# Patient Record
Sex: MALE | Race: WHITE | Employment: FULL TIME | URBAN - METROPOLITAN AREA
[De-identification: names, ages, dates, MRNs, and addresses within clinical notes are randomized per-mention and may not be internally consistent; named-entity substitution may affect disease eponyms.]

---

## 2020-09-15 ENCOUNTER — VIRTUAL VISIT (OUTPATIENT)
Dept: FAMILY MEDICINE | Facility: CLINIC | Age: 30
End: 2020-09-15
Payer: COMMERCIAL

## 2020-09-15 DIAGNOSIS — J02.9 SORE THROAT: ICD-10-CM

## 2020-09-15 DIAGNOSIS — R50.9 FEVER AND CHILLS: Primary | ICD-10-CM

## 2020-09-15 PROCEDURE — 99203 OFFICE O/P NEW LOW 30 MIN: CPT | Mod: 95 | Performed by: PHYSICIAN ASSISTANT

## 2020-09-15 NOTE — LETTER
September 15, 2020      Anuj Avendaño  [address] [address]        To Whom It May Concern:    Anuj Avendaño  was seen on 9/15/20.  Please excuse him until 9/18/20 due to family illness.        Sincerely,          Omar Love PA-C

## 2020-09-15 NOTE — Clinical Note
Patient coming in but is PUI and will be getting tested for COVID.  Have MA give letter to him.  Thanks.

## 2020-09-15 NOTE — PROGRESS NOTES
"Anuj Avendaño is a 29 year old male who is being evaluated via a billable video visit.      The patient has been notified of following:     \"This video visit will be conducted via a call between you and your physician/provider. We have found that certain health care needs can be provided without the need for an in-person physical exam.  This service lets us provide the care you need with a video conversation.  If a prescription is necessary we can send it directly to your pharmacy.  If lab work is needed we can place an order for that and you can then stop by our lab to have the test done at a later time.    Video visits are billed at different rates depending on your insurance coverage.  Please reach out to your insurance provider with any questions.    If during the course of the call the physician/provider feels a video visit is not appropriate, you will not be charged for this service.\"    Patient has given verbal consent for Video visit? Yes  How would you like to obtain your AVS? MyChart  If you are dropped from the video visit, the video invite should be resent to: Text to cell phone: [phone]  Will anyone else be joining your video visit? No    Subjective     Anuj Avendaño is a 29 year old male who presents today via video visit for the following health issues:    HPI    Acute Illness  Acute illness concerns: sore throat  Onset/Duration: couple days ago  Symptoms:  Fever: YES- 101.2  Chills/Sweats: YES- sweats  Headache (location?): YES  Sinus Pressure: YES  Conjunctivitis:  no  Ear Pain: no  Rhinorrhea: no  Congestion: YES- briefly  Sore Throat: YES  Cough: no  Wheeze: no  Decreased Appetite: YES  Nausea: no  Vomiting: no  Diarrhea: no  Dysuria/Freq.: no  Dysuria or Hematuria: no  Fatigue/Achiness: YES- fatigue  Sick/Strep Exposure: no  Therapies tried and outcome: Ibuprofen, theraflu tea, herbal tea, lots water, hottie tottie, chicken noodle soup     3 days of symptoms so far.  Fever started " yesterday and this AM fever is better.  Throat worse today and is the most troubling symptom. Left side is very sore.  Has had strep a lot when younger.  It does similar to strep to him. 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    He and his girlfriend and her family had an acute illness after returning from [place].  Also was tested in July due to an exposure which was negative.    T max 101.2  Today 98.6    Video Start Time: 11:10 AM      Review of Systems   Constitutional, HEENT, cardiovascular, pulmonary, gi and gu systems are negative, except as otherwise noted.      Objective    Vitals - Patient Reported  Temperature (Patient Reported): 98.6  F (37  C)(patient reported)      Vitals:  No vitals were obtained today due to virtual visit.    Physical Exam     GENERAL: Healthy, alert and no distress  EYES: Eyes grossly normal to inspection.  No discharge or erythema, or obvious scleral/conjunctival abnormalities.  RESP: No audible wheeze, cough, or visible cyanosis.  No visible retractions or increased work of breathing.    SKIN: Visible skin clear. No significant rash, abnormal pigmentation or lesions.  NEURO: Cranial nerves grossly intact.  Mentation and speech appropriate for age.  PSYCH: Mentation appears normal, affect normal/bright, judgement and insight intact, normal speech and appearance well-groomed.    Diagnostic Tests:  COVID   Strep        Assessment & Plan     Fever and chills  I think we do need to r/o COVID given that he has had fever and body aches/chills along with many other secondary symptoms.  - Symptomatic COVID-19 Virus (Coronavirus) by PCR; Future    Sore throat  Will also get a strep test done to r/o strep throat.  Work note written for patient as well.  - Streptococcus A Rapid Scr w Reflx to PCR; Future  - Symptomatic COVID-19 Virus (Coronavirus) by PCR; Future       Tests were ordered to be done in clinic today and appointment was  made for nurse only/lab only.    Return in about 3 days (around 9/18/2020) for if symptoms worsen or fail to improve.    Omar Love PA-C  [facility]      Video-Visit Details    Type of service:  Video Visit    Video End Time:11:29 AM    Originating Location (pt. Location): Home    Distant Location (provider location):  [facility]     Platform used for Video Visit: [employer]

## 2020-09-16 ENCOUNTER — ALLIED HEALTH/NURSE VISIT (OUTPATIENT)
Dept: FAMILY MEDICINE | Facility: CLINIC | Age: 30
End: 2020-09-16
Payer: COMMERCIAL

## 2020-09-16 DIAGNOSIS — R50.9 FEVER AND CHILLS: ICD-10-CM

## 2020-09-16 DIAGNOSIS — J02.9 SORE THROAT: ICD-10-CM

## 2020-09-16 DIAGNOSIS — R50.9 FEVER AND CHILLS: Primary | ICD-10-CM

## 2020-09-16 LAB
DEPRECATED S PYO AG THROAT QL EIA: NEGATIVE
SPECIMEN SOURCE: NORMAL
SPECIMEN SOURCE: NORMAL
STREP GROUP A PCR: NOT DETECTED

## 2020-09-16 PROCEDURE — 99207 ZZC NO CHARGE NURSE ONLY: CPT

## 2020-09-16 PROCEDURE — U0003 INFECTIOUS AGENT DETECTION BY NUCLEIC ACID (DNA OR RNA); SEVERE ACUTE RESPIRATORY SYNDROME CORONAVIRUS 2 (SARS-COV-2) (CORONAVIRUS DISEASE [COVID-19]), AMPLIFIED PROBE TECHNIQUE, MAKING USE OF HIGH THROUGHPUT TECHNOLOGIES AS DESCRIBED BY CMS-2020-01-R: HCPCS | Performed by: PHYSICIAN ASSISTANT

## 2020-09-16 PROCEDURE — 87651 STREP A DNA AMP PROBE: CPT | Performed by: PHYSICIAN ASSISTANT

## 2020-09-16 PROCEDURE — 40001204 ZZHCL STATISTIC STREP A RAPID: Performed by: PHYSICIAN ASSISTANT

## 2020-09-17 LAB
SARS-COV-2 RNA SPEC QL NAA+PROBE: NOT DETECTED
SPECIMEN SOURCE: NORMAL

## 2020-09-18 ENCOUNTER — MYC MEDICAL ADVICE (OUTPATIENT)
Dept: FAMILY MEDICINE | Facility: CLINIC | Age: 30
End: 2020-09-18

## 2020-09-18 NOTE — TELEPHONE ENCOUNTER
Routed to Omar Love,    See patient mychart message as FYI, no further action needed and can close encounter    Kelby Bravo RN

## 2021-01-15 ENCOUNTER — HEALTH MAINTENANCE LETTER (OUTPATIENT)
Age: 31
End: 2021-01-15

## 2021-09-04 ENCOUNTER — HEALTH MAINTENANCE LETTER (OUTPATIENT)
Age: 31
End: 2021-09-04

## 2022-02-13 ENCOUNTER — HEALTH MAINTENANCE LETTER (OUTPATIENT)
Age: 32
End: 2022-02-13

## 2022-10-16 ENCOUNTER — HEALTH MAINTENANCE LETTER (OUTPATIENT)
Age: 32
End: 2022-10-16

## 2023-03-26 ENCOUNTER — HEALTH MAINTENANCE LETTER (OUTPATIENT)
Age: 33
End: 2023-03-26